# Patient Record
Sex: MALE | Race: WHITE | NOT HISPANIC OR LATINO | URBAN - METROPOLITAN AREA
[De-identification: names, ages, dates, MRNs, and addresses within clinical notes are randomized per-mention and may not be internally consistent; named-entity substitution may affect disease eponyms.]

---

## 2018-10-16 NOTE — PATIENT DISCUSSION
Reviewed OCT with Dr. Viktor Thomas today, No clearance needed. Will use CME Protocol OS and will see Dr. Viktor Thomas after cataract surgery.

## 2018-10-16 NOTE — PATIENT DISCUSSION
The patient was informed that with the Basic option, they will most likely need prescription glasses at all focal points after surgery. The patient elects Basic OD, goal of emmetropia.

## 2018-12-03 NOTE — PATIENT DISCUSSION
Reviewed OCT with Dr. Blanco Singh today, No clearance needed. Will use CME Protocol OS and will see Dr. Blanco Singh after cataract surgery.

## 2018-12-03 NOTE — PATIENT DISCUSSION
Reviewed OCT with Dr. Naveen Heck, No clearance needed. Will use CME Protocol OS and will see Dr. Naveen Heck after cataract surgery.

## 2018-12-04 NOTE — PATIENT DISCUSSION
Cataract surgery has been performed in the first eye and activities of daily living are still impaired. The patient would like to proceed with cataract surgery in the second eye as scheduled. The patient elects Basic OS, goal of kenna.

## 2018-12-04 NOTE — PATIENT DISCUSSION
Reviewed OCT with Dr. Cyndi Vu, No clearance needed. Will use CME Protocol OS and will see Dr. Cyndi Vu after cataract surgery.

## 2018-12-10 NOTE — PATIENT DISCUSSION
Reviewed OCT with Dr. Adrian Way, No clearance needed. Will use CME Protocol OS and will see Dr. Adrian Way after cataract surgery.

## 2018-12-10 NOTE — PATIENT DISCUSSION
Reviewed OCT with Dr. Cassia Haney, No clearance needed. Will use CME Protocol OS and will see Dr. Cassia Haney after cataract surgery.

## 2018-12-11 NOTE — PATIENT DISCUSSION
Reviewed OCT with Dr. Flavio Flores, No clearance needed. Will use CME Protocol OS and will see Dr. Flavio Flores after cataract surgery.

## 2019-01-07 NOTE — PATIENT DISCUSSION
Reviewed OCT with Dr. Chinedu Abad, No clearance needed. Will use CME Protocol OS and will see Dr. Chinedu Abad after cataract surgery.

## 2019-05-08 NOTE — PATIENT DISCUSSION
Reviewed OCT with Dr. Dread Hannah, No clearance needed. Will use CME Protocol OS and will see Dr. Dread Hannah after cataract surgery.

## 2019-09-19 NOTE — PATIENT DISCUSSION
Will monitor closely, advised condition can 1.)stay the same 2.)release on it's own 3.) pull more and cause a MH and/or RT/RD requiring intervention.  At this time rec observation and RTC in 2 months.

## 2019-11-21 NOTE — PATIENT DISCUSSION
Will monitor closely, advised condition can 1.) stay the same 2.) release on it's own 3.) pull more and cause a MH and/or RT/RD requiring intervention.  At this time rec observation and RTC in 2 months.

## 2019-12-18 NOTE — PROCEDURE NOTE: SURGICAL
"<p>Prior to commencing surgery patient identification, surgical procedure, site, and side were confirmed by Dr. Crowell.&nbsp; Following topical proparacaine anesthesia, the patient was positioned at the YAG laser, a contact lens coupled to the cornea of the right eye with methylcellulose and an axial posterior capsulotomy performed without complication using 2.8 Mj x 16. Attention was then turned to the left eye and a contact lens coupled to the cornea of the left eye with methylcellulose and an axial posterior capsulotomy performed without complication using 2.7 Mj x 26. One drop of Alphagan was instilled in both eyes and the patient returned to the holding area having tolerated the procedure well and without complication. </p><p><span style=""font-size:12px;"">MRN:[id number]</span><br /></p>"

## 2020-03-04 NOTE — PATIENT DISCUSSION
Advised to call immediately if any worsening distortion or blurring is noted.
Discussed the risks/benefits of laser capsulotomy. Laser recommended. Patient elects to proceed.
Doing well.
Indications, risks, benefits and alternatives to YAG capsulotomy discussed with patient. Questions answered. Educational handout given.
Monitor.
No retinal detachment or retinal tear noted.
Patient elects to proceed with YAG capsulotomy.
Patient understands there is an increased risk of corneal edema after cataract surgery.
Recommended Observation.
Recommended artificial tears to use: 1 drop 4x a day in both eyes.
Recommended warm compresses twice daily.
Retinal detachment warnings given.
04-Mar-2020 13:28

## 2021-11-30 ENCOUNTER — ESTABLISHED COMPREHENSIVE EXAM (OUTPATIENT)
Dept: URBAN - METROPOLITAN AREA CLINIC 7 | Facility: CLINIC | Age: 66
End: 2021-11-30

## 2021-11-30 DIAGNOSIS — H35.453: ICD-10-CM

## 2021-11-30 DIAGNOSIS — H52.03: ICD-10-CM

## 2021-11-30 PROCEDURE — 92015 DETERMINE REFRACTIVE STATE: CPT

## 2021-11-30 PROCEDURE — 92014 COMPRE OPH EXAM EST PT 1/>: CPT

## 2021-11-30 PROCEDURE — 92250 FUNDUS PHOTOGRAPHY W/I&R: CPT

## 2021-11-30 ASSESSMENT — KERATOMETRY
OS_K2POWER_DIOPTERS: 44.00
OD_K2POWER_DIOPTERS: 43.50
OD_AXISANGLE2_DEGREES: 87
OS_AXISANGLE_DEGREES: 172
OS_K1POWER_DIOPTERS: 43.00
OD_K1POWER_DIOPTERS: 43.00
OS_AXISANGLE2_DEGREES: 82
OD_AXISANGLE_DEGREES: 177

## 2021-11-30 ASSESSMENT — VISUAL ACUITY
OU_SC: 20/20
OS_SC: 20/20
OD_SC: 20/20

## 2021-11-30 ASSESSMENT — TONOMETRY
OS_IOP_MMHG: 18
OD_IOP_MMHG: 20

## 2022-03-01 NOTE — PATIENT DISCUSSION
Next Appointment:  5/23/22  Last seen:  2/22/22  Last refill:  2/5/22- outside provider   Routed to PCP, was previously ordered by outside provider.    The patient was informed that with the Basic option, they will most likely need prescription glasses at all focal points after surgery. The patient elects Basic OD, goal of emmetropia.

## 2023-08-26 ASSESSMENT — KERATOMETRY
OS_K2POWER_DIOPTERS: 44.00
OS_AXISANGLE_DEGREES: 172
OD_AXISANGLE_DEGREES: 177
OS_K1POWER_DIOPTERS: 43.00
OD_K2POWER_DIOPTERS: 43.50
OD_K1POWER_DIOPTERS: 43.00
OD_AXISANGLE2_DEGREES: 87
OS_AXISANGLE2_DEGREES: 82

## 2023-08-28 ENCOUNTER — ESTABLISHED PATIENT (OUTPATIENT)
Dept: URBAN - METROPOLITAN AREA CLINIC 10 | Facility: CLINIC | Age: 68
End: 2023-08-28

## 2023-08-28 DIAGNOSIS — Z96.1: ICD-10-CM

## 2023-08-28 DIAGNOSIS — H52.03: ICD-10-CM

## 2023-08-28 DIAGNOSIS — H35.453: ICD-10-CM

## 2023-08-28 PROCEDURE — 92014 COMPRE OPH EXAM EST PT 1/>: CPT

## 2023-08-28 PROCEDURE — 92250 FUNDUS PHOTOGRAPHY W/I&R: CPT

## 2023-08-28 PROCEDURE — 92015 DETERMINE REFRACTIVE STATE: CPT

## 2023-08-28 ASSESSMENT — VISUAL ACUITY
OS_SC: 20/20
OU_SC: 20/20
OD_SC: 20/20

## 2023-08-28 ASSESSMENT — TONOMETRY
OD_IOP_MMHG: 15
OS_IOP_MMHG: 15
